# Patient Record
Sex: FEMALE | Race: OTHER | Employment: UNEMPLOYED | URBAN - METROPOLITAN AREA
[De-identification: names, ages, dates, MRNs, and addresses within clinical notes are randomized per-mention and may not be internally consistent; named-entity substitution may affect disease eponyms.]

---

## 2020-12-20 ENCOUNTER — HOSPITAL ENCOUNTER (EMERGENCY)
Age: 60
Discharge: HOME | End: 2020-12-20
Attending: EMERGENCY MEDICINE

## 2020-12-20 VITALS
SYSTOLIC BLOOD PRESSURE: 156 MMHG | OXYGEN SATURATION: 100 % | TEMPERATURE: 98.1 F | HEART RATE: 97 BPM | WEIGHT: 148.59 LBS | DIASTOLIC BLOOD PRESSURE: 83 MMHG | RESPIRATION RATE: 16 BRPM

## 2020-12-20 DIAGNOSIS — I10 HYPERTENSION, UNSPECIFIED TYPE: ICD-10-CM

## 2020-12-20 DIAGNOSIS — G51.0 BELL'S PALSY: Primary | ICD-10-CM

## 2020-12-20 LAB
ANION GAP SERPL CALC-SCNC: 3 MMOL/L (ref 5–15)
BASOPHILS # BLD: 0 K/UL (ref 0–0.1)
BASOPHILS NFR BLD: 0 % (ref 0–1)
BUN SERPL-MCNC: 10 MG/DL (ref 6–20)
BUN/CREAT SERPL: 13 (ref 12–20)
CALCIUM SERPL-MCNC: 9.9 MG/DL (ref 8.5–10.1)
CHLORIDE SERPL-SCNC: 103 MMOL/L (ref 97–108)
CO2 SERPL-SCNC: 32 MMOL/L (ref 21–32)
CREAT SERPL-MCNC: 0.8 MG/DL (ref 0.55–1.02)
DIFFERENTIAL METHOD BLD: ABNORMAL
EOSINOPHIL # BLD: 0.1 K/UL (ref 0–0.4)
EOSINOPHIL NFR BLD: 1 % (ref 0–7)
ERYTHROCYTE [DISTWIDTH] IN BLOOD BY AUTOMATED COUNT: 13.8 % (ref 11.5–14.5)
GLUCOSE SERPL-MCNC: 178 MG/DL (ref 65–100)
HCT VFR BLD AUTO: 40.5 % (ref 35–47)
HGB BLD-MCNC: 13 G/DL (ref 11.5–16)
IMM GRANULOCYTES # BLD AUTO: 0 K/UL (ref 0–0.04)
IMM GRANULOCYTES NFR BLD AUTO: 0 % (ref 0–0.5)
LYMPHOCYTES # BLD: 1.8 K/UL (ref 0.8–3.5)
LYMPHOCYTES NFR BLD: 35 % (ref 12–49)
MCH RBC QN AUTO: 29.2 PG (ref 26–34)
MCHC RBC AUTO-ENTMCNC: 32.1 G/DL (ref 30–36.5)
MCV RBC AUTO: 91 FL (ref 80–99)
MONOCYTES # BLD: 0.4 K/UL (ref 0–1)
MONOCYTES NFR BLD: 8 % (ref 5–13)
NEUTS SEG # BLD: 2.9 K/UL (ref 1.8–8)
NEUTS SEG NFR BLD: 55 % (ref 32–75)
NRBC # BLD: 0 K/UL (ref 0–0.01)
NRBC BLD-RTO: 0 PER 100 WBC
PLATELET # BLD AUTO: 111 K/UL (ref 150–400)
PMV BLD AUTO: 11.7 FL (ref 8.9–12.9)
POTASSIUM SERPL-SCNC: 4.2 MMOL/L (ref 3.5–5.1)
RBC # BLD AUTO: 4.45 M/UL (ref 3.8–5.2)
SODIUM SERPL-SCNC: 138 MMOL/L (ref 136–145)
WBC # BLD AUTO: 5.2 K/UL (ref 3.6–11)

## 2020-12-20 PROCEDURE — 74011000250 HC RX REV CODE- 250: Performed by: EMERGENCY MEDICINE

## 2020-12-20 PROCEDURE — 96374 THER/PROPH/DIAG INJ IV PUSH: CPT

## 2020-12-20 PROCEDURE — 36415 COLL VENOUS BLD VENIPUNCTURE: CPT

## 2020-12-20 PROCEDURE — 85025 COMPLETE CBC W/AUTO DIFF WBC: CPT

## 2020-12-20 PROCEDURE — 80048 BASIC METABOLIC PNL TOTAL CA: CPT

## 2020-12-20 PROCEDURE — 93005 ELECTROCARDIOGRAM TRACING: CPT

## 2020-12-20 PROCEDURE — 99284 EMERGENCY DEPT VISIT MOD MDM: CPT

## 2020-12-20 RX ORDER — HYDROCHLOROTHIAZIDE 25 MG/1
25 TABLET ORAL DAILY
Qty: 10 TAB | Refills: 0 | Status: SHIPPED | OUTPATIENT
Start: 2020-12-20 | End: 2020-12-30

## 2020-12-20 RX ORDER — VALACYCLOVIR HYDROCHLORIDE 1 G/1
1000 TABLET, FILM COATED ORAL 3 TIMES DAILY
Qty: 21 TAB | Refills: 0 | Status: SHIPPED | OUTPATIENT
Start: 2020-12-20 | End: 2020-12-27

## 2020-12-20 RX ORDER — LABETALOL HYDROCHLORIDE 5 MG/ML
20 INJECTION, SOLUTION INTRAVENOUS ONCE
Status: COMPLETED | OUTPATIENT
Start: 2020-12-20 | End: 2020-12-20

## 2020-12-20 RX ORDER — PREDNISONE 10 MG/1
TABLET ORAL
Qty: 48 TAB | Refills: 0 | Status: SHIPPED | OUTPATIENT
Start: 2020-12-20

## 2020-12-20 RX ORDER — ERYTHROMYCIN 5 MG/G
OINTMENT OPHTHALMIC
Qty: 1 TUBE | Refills: 0 | Status: SHIPPED | OUTPATIENT
Start: 2020-12-20 | End: 2020-12-27

## 2020-12-20 RX ADMIN — LABETALOL HYDROCHLORIDE 20 MG: 5 INJECTION INTRAVENOUS at 22:03

## 2020-12-21 LAB
ATRIAL RATE: 85 BPM
CALCULATED P AXIS, ECG09: 52 DEGREES
CALCULATED R AXIS, ECG10: 16 DEGREES
CALCULATED T AXIS, ECG11: 39 DEGREES
DIAGNOSIS, 93000: NORMAL
P-R INTERVAL, ECG05: 148 MS
Q-T INTERVAL, ECG07: 386 MS
QRS DURATION, ECG06: 94 MS
QTC CALCULATION (BEZET), ECG08: 459 MS
VENTRICULAR RATE, ECG03: 85 BPM

## 2020-12-21 NOTE — DISCHARGE INSTRUCTIONS
Patient Education        Parálisis facial de Haskins: Instrucciones de cuidado  Bell's Palsy: Care Instructions  Instrucciones de cuidado    La parálisis facial de Haskins es herve parálisis o debilitamiento de los músculos de un lado de la fina. Las personas con parálisis facial de Haskins suelen tener caído un lado de la boca y les jimena trabajo cerrar por completo el kelley de vu mismo lado. La parálisis facial de Haskins puede interferir también con el sentido del gusto. [place] sucede cuando se inflama un nervio de la fina. La causa de la parálisis facial de H[place]ins no es un ataque cerebral. No se conoce la causa de esta inflamación del nervio. Faisal algunos expertos piensan que la causa podría ser un virus. Debido a esto, en ocasiones los médicos recetan un medicamento antiviral para tratarla. También podrían darle medicamentos para reducir la hinchazón. La parálisis facial de Haskins por lo general mejora por sí afbi en algunas semanas o meses. La atención de seguimiento es herve parte clave de ruffin tratamiento y seguridad. Asegúrese de hacer y acudir a todas las citas, y llame a ruffin médico si está teniendo problemas. También es herve buena idea saber los resultados de herson exámenes y mantener herve lista de los medicamentos que guerline. ¿Cómo puede cuidarse en el hogar? · [employer] medicamentos exactamente vanda le fueron recetados. Llame a ruffin médico si yolanda estar teniendo problemas con ruffin medicamento. Recibirá [employer] medicamentos específicos recetados por ruffin médico.  · Use lágrimas artificiales o pomada si se le secan demasiado los ojos. La parálisis facial de [place]ins puede causar la caída del párpado inferior, lo que produce sequedad en el kelley. · Si no puede cerrar el kelley por completo, piense en usar un parche para dormir. · Ayúdese a parpadear usando un dedo para cerrar y abrir el párpado. [place] podría ayudar a mantener el kelley húmedo. · Use anteojos o gafas para prevenir que el polvo y la korina entren en el kelley.   · A medida que recupera la sensación en la fina, masajee la frente, las mejillas y los labios. El masaje podría fortalecer los músculos de la fina. · Cepíllese los dientes y use hilo dental con frecuencia para ayudar a prevenir las caries. La parálisis facial de [place]ins puede secar la saliva en un lado de ruffin boca. [place] aumenta el riesgo de caries. ¿Cuándo debe pedir ayuda? Llame al 911 en cualquier momento que considere que necesita atención de [place]. Por ejemplo, llame si:    · Tiene síntomas de un ataque cerebral. Estos pueden incluir:  ? Entumecimiento, hormigueo, debilidad o parálisis repentinos en la fina, el brazo o la pierna, sobre todo si ocurre en un solo lado del cuerpo. ? Cambios súbitos en la vista. ? Problemas repentinos para hablar. ? Confusión súbita o dificultad repentina para comprender frases sencillas. ? Problemas repentinos para caminar o mantener el equilibrio. ? Un dolor de golden intenso y repentino, distinto a los armen de Jean Geiger. Llame a ruffin médico ahora mismo o busque atención médica inmediata si:    · Siente entumecimiento o debilidad que se expande más allá de un lado de la fina.     · Tiene un salpullido en la piel o dolor o enrojecimiento en el kelley, o le molesta la kami.     · Tiene nuevo dolor de golden o Jovanna. Preste especial atención a los cambios en ruffin connor y asegúrese de comunicarse con ruffin médico si:    · No mejora vanda se esperaba. ¿Dónde puede encontrar más información en inglés? Vaya a [email or web address]  Arturo P168 en la búsqueda para aprender más acerca de \"Parálisis facial de Haskins: Instrucciones de cuidado. \"  Revisado: 20 reed, 7999               HSSTQSS del contenido: 12.6  © 7463-5680 [facility], Incorporated. Las instrucciones de cuidado fueron adaptadas bajo licencia por Good Help Connections (which disclaims liability or warranty for this information).  Si usted tiene [facility] afección médica o [employer] estas instrucciones, siempre pregunte a ruffin profesional de connor. [facility], Incorporated niega toda garantía o responsabilidad por ruffin uso de esta información. Patient Education        Presión arterial eda: Instrucciones de cuidado  High Blood Pressure: Care Instructions  Instrucciones de cuidado     Si ruffin presión arterial suele ser superior a 130/80, usted tiene presión arterial eda o hipertensión. [place] significa que el número de [place] es 130 o más alto o que el número de abajo es [de-identified] o [address], o ambas cosas. A pesar de lo que mucha gente yolanda, la hipertensión no suele causar dolor de golden ni provocar mareos o aturdimiento. Generalmente, no presenta síntomas. Sin embargo, aumenta el riesgo de ataque al corazón, ataque cerebral, y daños en los riñones o en los ojos. A mayor presión arterial, mayor riesgo. Ruffin médico le dará herve meta para ruffin presión arterial. Ruffin meta se basará en ruffin connor y ruffin edad. Los cambios de estilo de stef, vanda alimentarse en forma saludable y [place]OT[place], siempre son importantes para ayudarle a bajar la presión arterial. También podría lacey medicamentos para lograr ruffin meta para la presión arterial.  La atención de seguimiento es herve parte clave de ruffin tratamiento y seguridad. Asegúrese de hacer y acudir a todas las citas, y llame a ruffin médico si está teniendo problemas. También es herve buena idea saber los resultados de herson exámenes y mantener herve lista de los medicamentos que guerline. ¿Cómo puede cuidarse en el hogar? Tratamiento médico  · Si angelica de lacey herson medicamentos, ruffin presión arterial volverá a subir. Es posible que deba lacey un tipo de [place], o más de [place], para reducir la presión arterial. Sea alva con los medicamentos. [place] ruffin medicamento exactamente vanda se lo hayan indicado. Llame a ruffin médico si yolanda estar teniendo problemas con ruffin medicamento. · Hable con ruffin médico antes de empezar a lacey [facility].  La aspirina puede ayudar a determinadas personas a reducir ruffin riesgo de tener un ataque cardíaco o un ataque cerebral. Faisal lacey aspirina no es adecuado para todo el TRENGEREID, debido a que puede causar sangrado grave. · Visite a ruffin médico periódicamente. Usted podría necesitar consultar a ruffin médico con más frecuencia al principio o hasta que se reduzca ruffin presión arterial.  · Si usted está tomando medicamentos para la presión arterial, hable con ruffin médico antes de lacey medicamentos descongestionantes o antiinflamatorios, vanda ibuprofeno. Algunos de estos medicamentos pueden elevar la presión arterial.  · Aprenda a revisarse la presión arterial en ruffin hogar. Cambios en el estilo de stef   · Mantenga un peso saludable. [place] es particularmente importante si aumenta de peso en la kim de la cintura. Bajar solo 10 libras (4.5 kg) puede ayudarle a reducir ruffin presión arterial.  · Mary más ejercicios si ruffin médico se lo recomienda. Caminar es herve buena opción. Poco a poco, aumente la distancia que [employer]. Intente hacer por lo menos 30 minutos de ejercicio la mayoría de los días de la [place]. También podría nadar, montar en bicicleta o hacer otras actividades. · No tome alcohol o limite la cantidad que mary. Hable con ruffin médico acerca de si puede lacey alcohol. · Trate de limitar la cantidad de sodio que consume a menos de 2,300 miligramos (mg) al día. Ruffin médico podría pedirle que trate de consumir menos de 1,500 mg al día. · Coma abundantes frutas (vanda bananas [plátanos] y naranjas), verduras, legumbres, granos integrales y productos lácteos de bajo contenido de Ramona medrano. · Reduzca la cantidad de grasas saturadas en ruffin dieta. Los productos derivados de los [place], vanda la [place], el queso y la carne, contienen grasas saturadas. El limitar estos alimentos podría ayudarle a bajar de peso y [place] reducir ruffin riesgo de herve enfermedad cardíaca. · No fume. El hábito de fumar aumenta ruffin riesgo de ataque cerebral y ataque al corazón.  Si necesita ayuda para dejar de fumar, hable con ruffin médico sobre programas y medicamentos para dejar de fumar. Estos pueden aumentar herson probabilidades de dejar el hábito para siempre. ¿Cuándo debe pedir ayuda? Llame al  911 en cualquier momento que considere que necesita atención de [place]. [place] puede significar que tiene síntomas que sugieren que ruffin presión arterial le está causando un problema cardíaco o vascular grave. Ruffin presión arterial podría ser superior a 180/120. Por ejemplo, llame al 911 si:    · Tiene síntomas de un ataque al corazón. Estos pueden incluir:  ? Revonda Du en el pecho, o herve sensación extraña en el pecho.  ? Sudoración. ? Falta de aire. ? Náuseas o vómito. ? Dolor, presión o herve sensación extraña en la espalda, el ynes, la mandíbula, la parte superior del abdomen o en reynold o ambos hombros o brazos. ? Aturdimiento o debilidad repentina. ? Latidos del corazón rápidos o irregulares.     · Tiene síntomas de un ataque cerebral. Estos pueden incluir:  ? Entumecimiento, hormigueo, debilidad o parálisis repentinos en la fina, el brazo o la pierna, sobre todo en un solo lado del cuerpo. ? Cambios repentinos en la visión. ? Dificultades repentinas para hablar. ? Confusión repentina o dificultad súbita para comprender frases sencillas. ? Problemas repentinos para caminar o mantener el equilibrio. ? Dolor de [place] intenso y repentino, distinto de los armen de golden anteriores.     · Tiene un dolor intenso en la espalda o el abdomen. No espere a que la presión arterial baje por sí fabi. Obtenga ayuda de inmediato. Llame a ruffin médico ahora mismo o busque atención de inmediato si:    · Tiene la presión arterial mucho más eda de lo normal (vanda 180/120 o más eda), johanne no tiene síntomas.     · Ermelinda que la presión arterial eda le está causando síntomas, tales vanda:  ? Dolor de golden intenso. ? Samaria Taco.    Preste especial atención a los cambios en ruffin connor y asegúrese de comunicarse con ruffin médico si:    · Ruffin presión arterial está más eda de lo que ruffin médico recomienda al medírsela en al menos 2 ocasiones. [place] significa que el número de [place] es más alto o [address] de abajo es más alto, o ambas cosas.     · Ermelinda que podría estar teniendo efectos secundarios de los medicamentos para la presión arterial.   ¿Dónde puede encontrar más información en inglés? Rah Mention a [email or web address]  Arturo [id number] en la búsqueda para aprender más acerca de \"Presión arterial eda: Instrucciones de cuidado. \"  Revisado: 16 gely, 3223               GKWPSQO del contenido: 12.6  © 5722-3217 Healthwise, Incorporated. Las instrucciones de cuidado fueron adaptadas bajo licencia por [employer] (which disclaims liability or warranty for this information). Si usted tiene [place] afección médica o sobre estas instrucciones, siempre pregunte a ruffin profesional de connor. Healthwise, Incorporated niega toda garantía o responsabilidad por ruffin uso de esta información. Patient Education        Aprenda sobre la presión arterial eda  Learning About High Blood Pressure  ¿Qué es la presión arterial eda? La presión arterial es herve medida de la fuerza que ejerce la yimi contra las segal de las arterias. Es normal que la presión arterial suba y baje a lo mirta del día, johanne si se mantiene eda, usted tiene la presión arterial eda. Otro nombre para la presión arterial eda es hipertensión. Dos números le indican ruffin presión arterial. El primer número indica la presión sistólica. Esta muestra qué tan melisa presiona la yimi cuando el corazón está bombeando. El marcia número indica la presión diastólica. Esta muestra qué tan melisa presiona la [employer] latidos, cuando el corazón está relajado y se está llenando de [place]. Ruffin médico le dará un objetivo de presión arterial. El objetivo se basará en ruffin connor y ruffin edad.  La presión arterial eda (hipertensión) significa que el número de [place] se [address], o el número de abajo permanece alto, o ambas cosas. La presión arterial eda aumenta el riesgo de ataque cerebral, ataque cardíaco y otros problemas. Usted y ruffin médico hablarán sobre los riesgos de estos problemas en relación con ruffin presión arterial.  ¿Qué ocurre cuando usted tiene presión arterial eda? · La yimi fluye por las arterias con [place]. Con el tiempo, esto puede dañar el corazón y [address] arterias. Faisal usted no puede notarlo. La presión arterial eda no suele causar síntomas. · La presión arterial eda hace que el corazón se esfuerce más. Y eso puede provocar insuficiencia cardíaca, lo que significa que el corazón no bombea tanta yimi vanda el cuerpo necesita. · Se empieza a acumular grasa y calcio en las arterias. Esta acumulación se llama endurecimiento de las arterias. Puede causar [employer], entre ellos ataque cardíaco y ataque cerebral.  · Las arterias también transportan yimi y oxígeno a [employer] ojos, los riñones y el cerebro. Si la presión arterial eda daña esas arterias, puede causar pérdida de visión, enfermedad renal, ataque cerebral y un mayor riesgo de demencia. ¿Cómo puede prevenir la presión arterial eda? · Mantenga un peso saludable. · Trate de limitar la cantidad de sodio que consume a menos de 2,300 miligramos (mg) al día. Si limita ruffin consumo de sodio a 1,500 mg al día, puede reducir ruffin presión arterial aún más. ? Compre alimentos Kathlynn Seeds diga \"sin sal\" (\"unsalted\"), [place] de sodio\" (\"sodium-free\") o \"bajo en sodio\" (\"low-sodium\"). Los alimentos que indiquen en la etiqueta \"reducido en sodio\" (\"reduced-sodium\") y \"poco sodio\" (\"light sodium\") aún pueden contener demasiado sodio. ?  Sazone herson comidas con ajo, jugo de mickie, cebolla, vinagre, hierbas y especias en lugar de sal. No use salsa de soya, salsa para chantelle, sal de cebolla, sal de ajo, mostaza ni ketchup (salsa de tomate) en herson alimentos. ? Use menos sal (o nada) de lo que indique la receta. Por lo general, puede añadir la mitad de la cantidad de ahmet que pide la receta sin que la comida pierda el sabor. · Manténgase activo físicamente. Warner por lo menos 30 minutos de ejercicio la mayoría de los días de la [place]. Caminar es herve buena opción. Ingris Cassette desee hacer otras actividades, vanda correkenny, nadar, [employer], jugar al tenis o practicar otros deportes de equipo. · Limite el alcohol a 2 bebidas al día para los hombres y 1 bebida al día para las mujeres. · Coma muchas frutas, verduras y productos lácteos bajos en grasa. Coma menos grasas saturadas y grasas totales. ¿Cómo se trata la presión arterial eda? · Ruffin médico sugerirá que warner cambios en ruffin estilo de stef para ayudar al corazón. Por ejemplo, ruffin médico podría pedirle que coma alimentos saludables, deje de fumar, baje el peso que tenga de [place] y [address] [place] [address] Jamal. · Si los cambios de [facility] de stef no [place] branch, ruffin médico podría recomendarle que tome medicamentos. · Cuando la presión arterial es muy eda, se necesitan medicamentos para bajarla. La atención de seguimiento es herve parte clave de ruffin tratamiento y seguridad. Asegúrese de hacer y acudir a todas las citas, y llame a ruffin médico si está teniendo problemas. También es herve buena idea saber los resultados de herson exámenes y mantener herve lista de los medicamentos que guerline. ¿Dónde puede encontrar más información en inglés? Ilad Юлия a [email or web address].com/  Escriba P501 en la búsqueda para aprender más acerca de \"Aprenda sobre la presión arterial eda. \"  Revisado: 16 gely, 0906               CARLAOAQKS del contenido: 12.6  © 0566-1007 [employer], Incorporated. Las instrucciones de cuidado fueron adaptadas bajo licencia por Good Help Connections (which disclaims liability or warranty for this information).  Si usted tiene [place] afección médica o sobre estas instrucciones, siempre pregunte a ruffin profesional de connor. [facility], Incorporated niega toda garantía o responsabilidad por ruffin uso de esta información. Patient Education        Dieta baja en sodio (2,000 miligramos): Instrucciones de cuidado  Low Sodium Diet (2,000 Milligram): Care Instructions  Instrucciones de cuidado    Demasiado sodio hace que el organismo retenga agua en exceso. [place] puede aumentar la presión arterial y obligar al corazón y a los riñones a trabajar Volanda Perish. En casos muy graves, podrían tener que hospitalizarlo. Hasta podría poner en peligro la stef. Si limita el consumo de sodio, se sentirá mejor y reducirá ruffin riesgo de tener problemas graves. La ryder más común de sodio es la sal. Las personas obtienen la mayor parte de la sal en ruffin dieta de los alimentos enlatados, preparados y de paquete. La comida rápida y los platillos de restaurante también tienen niveles muy altos de [place]. Es probable que ruffin médico le limite el sodio a menos de 2,000 miligramos (mg) al día. Brianda límite tiene en cuenta todo el sodio presente en los alimentos preparados y de Melfa air force, y toda la sal que añada a herson alimentos. La atención de seguimiento es herve parte clave de ruffin tratamiento y seguridad. Asegúrese de hacer y acudir a todas las citas, y llame a ruffin médico si está teniendo problemas. También es herve buena idea saber los resultados de herson exámenes y mantener herve lista de los medicamentos que guerline. ¿Cómo puede cuidarse en el hogar? Liana las etiquetas de los alimentos  · Liana las etiquetas de las latas y los alimentos de paquete. La [employer] qué cantidad de sodio (\"sodium\") hay en cada porción. Asegúrese de fijarse en el tamaño de la porción. Si usted come Abdoulaye, [employer] porción, consumirá Gaatu. · Las etiquetas de los alimentos también indican el Porcentaje del Valor Diario (\"Percent Daily Value\") recomendado para el sodio.  Escoja productos con un bajo Porcentaje del Valor Diario de sodio. · Tenga en cuenta que el sodio puede venir en otras formas además de la sal, entre las que se incluyen el glutamato monosódico (MSG, por herson siglas en inglés), el citrato de sodio y el bicarbonato de [place]. La comida asiática frecuentemente contiene MSG añadido. Si sale a comer, a veces puede pedir que no le pongan MSG ni sal a herson alimentos. Compre alimentos bajos en sodio  · Compre alimentos que indiquen en la etiqueta \"sin sal\" (\"unsalted\") (sin sal añadida), \"sin sodio\" (\"sodium-free\") (menos de 5 mg de sodio por porción) o \"bajo en sodio\" (\"low-sodium\") (menos de 140 mg de sodio por porción). Los alimentos que indiquen en la etiqueta \"reducido en sodio\" (\"reduced-sodium\") y \"ligero contenido de sodio\" (\"light sodium\") aún pueden contener demasiado sodio. Asegúrese de leer la etiqueta para verificar cuánto sodio está consumiendo. · Compre verduras frescas o congeladas que no tengan salsas [id number]. Compre las versiones de bajo sodio de verduras Kirksherryæleonardo, sopas y otros productos enlatados. Prepare comidas bajas en sodio  · Reduzca la cantidad de sal que [place] para cocinar. [place] le ayudará a acostumbrarse al [employer]. No añada ahmet después de sravani cocinado. Christine cucharadita de sal contiene alrededor de 2,300 mg de sodio. · Retire el salero de la garcia. · Sazone herson comidas con ajo, jugo de mickie, cebolla, vinagre, hierbas y especias. No use salsa de soya, salsa de soya \"lite\", salsa para chantelle, sal de cebolla, sal de ajo o de apio, mostaza ni ketchup (salsa de tomate) en herson comidas. · Use aderezos para ensaladas, salsas y ketchup de bajo contenido de [place]. O prepare herson propios aderezos para ensaladas y salsas sin añadir sal.  · Use menos sal (o nada) cuando la receta lo pida. Por lo general puede añadir la mitad de la cantidad de ahmet que pide la receta sin que esta pierda el sabor.  Otros alimentos vanda el arroz, las pastas y los cereales no necesitan sal adicional.  · Enjuague las verduras enlatadas y cocínelas en agua fresca. [place] le ousmane algo de sal, johanne no toda. · Evite el agua que naturalmente tenga un alto contenido de sodio o que haya sido tratada con ablandadores, los cuales [place]. Llame a ruffin compañía de agua local para averiguar cuál es el contenido de sodio del agua. Si compra agua embotellada, viji la etiqueta y escoja herve ava sin sodio. Evite los alimentos altos en sodio  · Evite comer:  ? Luna, pescados y aves [place], curados, salados y [place] falls. ? Eli Zacarias, perros calientes (\"hot dogs\") y luna frías. ? Queso común, key y procesado y [place] de cacahuate (maní) común. ? Ken Miladys y otros refrigerios salados vanda \"pretzels\", \"chips\" (vanda anitha fritas) y palomitas de maíz saladas. ? Comidas preparadas y congeladas, a menos que tengan herve etiqueta que diga \"bajo en sodio\" (\"low-sodium\"). ? Valeta [place] y consomés enlatados y secos o deshidratados, a menos que digan \"sin sodio\" (\"sodium-free\") o \"bajo en sodio\" (\"low-sodium\"). ? Verduras enlatadas, a menos que digan \"sin sodio\" (\"sodium-free\") o \"bajo en sodio\" (\"low-sodium\"). ? Anitha fritas (a la francesa), pizzas, tacos y otras comidas rápidas. ? Pepinillos, aceitunas, ketchup y otros condimentos, en especial la salsa de soya, a menos que digan \"sin sodio\" (\"sodium-free\") o \"bajo en sodio\" (\"low-sodium\"). ¿Dónde puede encontrar más información en inglés? Red a [email or web address]  Zhou Lucille [id number] en la búsqueda para aprender más acerca de \"Dieta baja en sodio (2,000 miligramos): Instrucciones de cuidado. \"  Revisado: 22 agosto, 0094               NCXZYKF del contenido: 12.6  © 5703-0100 [employer], Incorporated. Las instrucciones de cuidado fueron adaptadas bajo licencia por Good Help Connections (which disclaims liability or warranty for this information).  Si usted tiene [place] afección médica o sobre estas instrucciones, siempre pregunte a ruffin profesional de connor. Healthwise, Incorporated niguy toda garantía o responsabilidad por ruffin uso de esta información. [facility] Departments     For adult and child immunizations, family planning, TB screening, STD testing and women's health services. [facility]: Catarina [phone]      [facility]   [address]   [address]   170 [facility]: [facility] Right [address] [facility] [address] [phone]      2400 [facility]          [place] [place] [address]     For primary care services, woman and child wellness, and some clinics providing specialty care. [facility] -- [address] [facility] [phone]   411 [facility] 200 [facility] [address] [place] [address] [phone]   [address] [facility] [address]  [phone] [address]  [employer] [address] [place] [address] 5850  [address]  [phone]   [facility] [address] [phone]   [facility] 81 [facility] [phone]   [address] [phone]   [facility]: [facility] 700 tish Gracia [address] [place] [address], [address] [phone]     Mio [address] [facility] [address] [phone]   [facility] [address]  [phone]   [facility]  [address], [place] [address] ([email or web address]) [phone]         Sexual Health/Woman Wellness Clinics    For STD/HIV testing and treatment, pregnancy testing and services, men's health, birth control services, LGBT services, and hepatitis/HPV vaccine services. [employer] for [employer] [address] [facility] 75 [facility] 157 600 [address] Rodriguez [phone]   [address] [place] [address] Rd, 5th floor [phone]   Pregnancy [address] 200 [facility] [address]  [place] [phone]         [place] [address] [facility] [address] [place] [address]   [phone]   [place]   [phone]   [facility]: [address] [phone]       600 [facility]   [phone]   [address]   9870 [facility]     [phone]   [address] Crisis   1212 [facility] [phone]     Local Primary Care Physicians  [facility] [phone]  MD Gretchen Clifford, MD Diana Willard MD [facility] [phone]  Blair Acosta, [facility]  Isaiah Meyer, MD Ashlee Trimble MD [address] [address] [phone]  Waleska Ward, [place] [address] [facility] [phone]  MD Denita Crowe, MD Karen Pagan MD   [facility] [phone]  BIJAN CROWELL, MD Delfina Alexis, MD Bach Mittie, NP 3050 [facility] [phone]  MD Angella Tse MD Netty Plants, MD Apurva Barreto, MD Deepti Shepard, MD Fabiola Medrano [facility]  Wil Ray MD [address] [facility] [phone]  MD Jose Andre, FRANK Grossman, MD Jacque Buckley, MD Froylan Witt, MD Naomi Schaefer MD   [address]14 [facility] [phone]  MD Andreina Garcia, [facility]  Lanora Soulier, FRANK Varela, MD Claudia Coe, MD Abena Blancas Res, MD [facility] [phone]  MD [place] Castellanos MD Hilbert Moore, MD Terrie Mention, MD Nicolasa Rummer, MD   [address] [phone]  MD Ayde Ramirez MD [place] [phone]  MD Ruth Zayas, MD Yamile Toscano, MD   [facility] [phone]  Sherren Heal, MD Carlton Arora, MD Yara Carbajal, MD Sunday Goins, MD French Monae, MD Soren Jain, NP  Cirilo Martell MD 1619 [facility]   [phone]  MD Charles Guevara MD Crisoforo Grounds, MD   [address] [place] [phone]  [address] [place] [address] MD Joseph Foreman, FNP  HAYLEY Suarez, MASONP  HAYLEY Elaine MD Darletta Rainwater, FRANK Rendon,  Miscellaneous:  Kyle Dee [phone]

## 2020-12-21 NOTE — ED PROVIDER NOTES
EMERGENCY DEPARTMENT HISTORY AND PHYSICAL EXAM      Date: 12/20/2020  Patient Name: Lizzie Cohn    History of Presenting Illness     Chief Complaint   Patient presents with   [facility] Referral / Consult     ref[facility] by [facility] for evaluation of [place] Palsy and HTN    Hypertension       History Provided By: Patient    HPI: Lizzie Cohn, 56 y.o. female with a past medical history significant for no known past medical history presents to the ED with cc of moderate to severe elevation of blood pressure as well as left-sided facial weakness. Patient reports waking up and noticing some tingling along the left side of the face. She is unable to close her eyes and fully smile on the left side. She has no arm or leg weakness, no arm or leg numbness, no visual changes, no no speech disturbances, no gait disturbances. [facility] and sent over because of elevated blood pressure. She denies any headache, chest pain, trouble breathing, fevers, chills, or any other associated symptoms. No other exacerbating ambulating factors. There are no other complaints, changes, or physical findings at this time. PCP: None    No current facility-administered medications on file prior to encounter. No current outpatient medications on file prior to encounter. Past History     Past Medical History:  No past medical history on file. Past Surgical History:  No past surgical history on file. Family History:  No family history on file. Social History:  Social History     Tobacco Use    Smoking status: Not on file   Substance Use Topics    Alcohol use: Not on file    Drug use: Not on file       Allergies:  Not on File      Review of Systems   Review of Systems   Constitutional: Negative for chills, diaphoresis, fatigue and fever. HENT: Negative for ear pain and sore throat. Eyes: Negative for pain and redness. Respiratory: Negative for cough and shortness of breath.     Cardiovascular: Negative for chest pain and leg swelling. Gastrointestinal: Negative for abdominal pain, diarrhea, nausea and vomiting. Endocrine: Negative for cold intolerance and heat intolerance. Genitourinary: Negative for flank pain and hematuria. Musculoskeletal: Negative for back pain and neck stiffness. Skin: Negative for rash and wound. Neurological: Negative for dizziness, syncope and headaches. All other systems reviewed and are negative. Physical Exam   Physical Exam  Vitals signs and nursing note reviewed. Constitutional:       Appearance: She is well-developed. HENT:      Head: Normocephalic and atraumatic. Mouth/Throat:      Pharynx: No oropharyngeal exudate. Eyes:      General: No visual field deficit. Conjunctiva/sclera: Conjunctivae normal.      Pupils: Pupils are equal, round, and reactive to light. Neck:      Musculoskeletal: Normal range of motion. Cardiovascular:      Rate and Rhythm: Normal rate and regular rhythm. Heart sounds: No murmur. Pulmonary:      Effort: Pulmonary effort is normal. No respiratory distress. Breath sounds: Normal breath sounds. No wheezing. Abdominal:      General: Bowel sounds are normal. There is no distension. Palpations: Abdomen is soft. Tenderness: There is no abdominal tenderness. Musculoskeletal: Normal range of motion. General: No deformity. Skin:     General: Skin is warm and dry. Findings: No rash. Neurological:      Mental Status: She is alert and oriented to person, place, and time. GCS: GCS eye subscore is 4. GCS verbal subscore is 5. GCS motor subscore is 6. Cranial Nerves: Facial asymmetry present. No dysarthria. Sensory: No sensory deficit. Motor: Motor function is intact. No weakness, tremor, abnormal muscle tone or pronator drift. Coordination: Coordination is intact. Coordination normal. Finger-Nose-Finger Test and Heel to [facility] Test normal.      Gait: Gait is intact. Comments: Patient has left-sided facial nerve deficits in both the upper and lower branches. She is unable to close to eyes effectively on the left side. She has normal sensation on the face and is symmetric on both sides. Her visual fields are intact and she has normal extraocular eye movements. She has no pronator drift or lower extremity weakness. No sensory deficit in any extremity. Psychiatric:         Behavior: Behavior normal.         Diagnostic Study Results     Labs -     Recent Results (from the past 24 hour(s))   CBC WITH AUTOMATED DIFF    Collection Time: 12/20/20  9:44 PM   Result Value Ref Range    WBC 5.2 3.6 - 11.0 K/uL    RBC 4.45 3.80 - 5.20 M/uL    HGB 13.0 11.5 - 16.0 g/dL    HCT 40.5 35.0 - 47.0 %    MCV 91.0 80.0 - 99.0 FL    MCH 29.2 26.0 - 34.0 PG    MCHC 32.1 30.0 - 36.5 g/dL    RDW 13.8 11.5 - 14.5 %    PLATELET 961 (L) 378 - 400 K/uL    MPV 11.7 8.9 - 12.9 FL    NRBC 0.0 0  WBC    ABSOLUTE NRBC 0.00 0.00 - 0.01 K/uL    NEUTROPHILS 55 32 - 75 %    LYMPHOCYTES 35 12 - 49 %    MONOCYTES 8 5 - 13 %    EOSINOPHILS 1 0 - 7 %    BASOPHILS 0 0 - 1 %    IMMATURE GRANULOCYTES 0 0.0 - 0.5 %    ABS. NEUTROPHILS 2.9 1.8 - 8.0 K/UL    ABS. LYMPHOCYTES 1.8 0.8 - 3.5 K/UL    ABS. MONOCYTES 0.4 0.0 - 1.0 K/UL    ABS. EOSINOPHILS 0.1 0.0 - 0.4 K/UL    ABS. BASOPHILS 0.0 0.0 - 0.1 K/UL    ABS. IMM.  GRANS. 0.0 0.00 - 0.04 K/UL    DF AUTOMATED     METABOLIC PANEL, BASIC    Collection Time: 12/20/20  9:44 PM   Result Value Ref Range    Sodium 138 136 - 145 mmol/L    Potassium 4.2 3.5 - 5.1 mmol/L    Chloride 103 97 - 108 mmol/L    CO2 32 21 - 32 mmol/L    Anion gap 3 (L) 5 - 15 mmol/L    Glucose 178 (H) 65 - 100 mg/dL    BUN 10 6 - 20 MG/DL    Creatinine 0.80 0.55 - 1.02 MG/DL    BUN/Creatinine ratio 13 12 - 20      GFR est AA >60 >60 ml/min/1.73m2    GFR est non-AA >60 >60 ml/min/1.73m2    Calcium 9.9 8.5 - 10.1 MG/DL       Radiologic Studies -   No orders to display     CT Results  (Last 48 hours) None        CXR Results  (Last 48 hours)    None            Medical Decision Making   I am the first provider for this patient. I reviewed the vital signs, available nursing notes, past medical history, past surgical history, family history and social history. Vital Signs-Reviewed the patient's vital signs. Patient Vitals for the past 12 hrs:   Temp Pulse Resp BP SpO2   12/20/20 2209    (!) 156/83    12/20/20 2115    (!) 205/101    12/20/20 2108    (!) 196/96    12/20/20 2019 98.1 °F (36.7 °C) 97 16 (!) 204/94 100 %       Records Reviewed: Nursing records and medical records reviewed    MDM:  Elevated blood pressure, versus stroke versus Bell's palsy    Provider Notes (Medical Decision Making):   Patient is a 78-year-old female presenting with symptoms consistent with Bell's palsy. Incidentally, patient is noted to have elevated blood pressure which I do not believe is related to her presentation. She has involvement of both the upper and lower branches of the facial nerve which is not consistent with an acute stroke. Additionally there are no other neurologic deficits on exam and she has no facial deficits on the face. I will treat her with standard care for her Bell's palsy and I will start her on blood pressure medications. Strict return precautions were given. ED Course:   Initial assessment performed. The patients presenting problems have been discussed, and they are in agreement with the care plan formulated and outlined with them. I have encouraged them to ask questions as they arise throughout their visit. ED Course as of Dec 20 2240   Sun Dec 20, 2020   2213 EKG: Normal sinus rhythm with rate 85. Incomplete right bundle branch block. Normal DC/QRS RS intervals. No STEMI. No ST segment changes or T wave inversions.   EKG interpreted by myself, Dr. Lucy Tlelo.    [CC]      ED Course User Index  [CC] Noelle Bear MD       Medications Administered     labetaloL (NORMODYNE;TRANDATE) injection 20 mg     Admin Date  12/20/2020 Action  Given Dose  20 mg Route  IntraVENous Administered By  Kavon Hernandez                    Critical Care:  None      Disposition:  10:41 PM  Miguel Love  results have been reviewed with her. She has been counseled regarding her diagnosis. She verbally conveys understanding and agreement of the signs, symptoms, diagnosis, treatment and prognosis and additionally agrees to follow up as recommended with Dr. None in 24 - 48 hours. She also agrees with the care-plan and conveys that all of her questions have been answered. I have also put together some discharge instructions for her that include: 1) educational information regarding their diagnosis, 2) how to care for their diagnosis at home, as well a 3) list of reasons why they would want to return to the ED prior to their follow-up appointment, should their condition change. DISCHARGE PLAN:  1. Current Discharge Medication List      START taking these medications    Details   valACYclovir (VALTREX) 1 gram tablet Take 1 Tab by mouth three (3) times daily for 7 days. Qty: 21 Tab, Refills: 0      predniSONE (STERAPRED DS) 10 mg dose pack Per packet instructions  Qty: 48 Tab, Refills: 0      hydroCHLOROthiazide (HYDRODIURIL) 25 mg tablet Take 1 Tab by mouth daily for 10 days. Qty: 10 Tab, Refills: 0      erythromycin (ILOTYCIN) ophthalmic ointment Applied to the left eye each evening at bedtime. Qty: 1 Tube, Refills: 0           2. Follow-up Information     Follow up With Specialties Details Why Contact Info    Primary care physician  In 3 days For a follow-up evaluation. Karri Sorensen, DO Neurology In 3 days For a follow-up evaluation. This is a neurology specialist you can see for further evaluation of your Bell's palsy. [address] [facility]  [facility]  [phone]          3.   Return to ED if worse     Diagnosis     Clinical Impression: 1. Bell's palsy    2. Hypertension, unspecified type        Attestations:    Long Hanks MD    Please note that this dictation was completed with [employer], the computer voice recognition software. Quite often unanticipated grammatical, syntax, homophones, and other interpretive errors are inadvertently transcribed by the computer software. Please disregard these errors. Please excuse any errors that have escaped final proofreading. Thank you.

## 2023-05-15 RX ORDER — PREDNISONE 10 MG/1
TABLET ORAL
COMMUNITY
Start: 2020-12-20